# Patient Record
Sex: MALE
[De-identification: names, ages, dates, MRNs, and addresses within clinical notes are randomized per-mention and may not be internally consistent; named-entity substitution may affect disease eponyms.]

---

## 2017-08-09 ENCOUNTER — TRANSCRIPTION ENCOUNTER (OUTPATIENT)
Age: 47
End: 2017-08-09

## 2019-02-12 PROBLEM — Z00.00 ENCOUNTER FOR PREVENTIVE HEALTH EXAMINATION: Status: ACTIVE | Noted: 2019-02-12

## 2019-06-20 ENCOUNTER — APPOINTMENT (OUTPATIENT)
Dept: OTOLARYNGOLOGY | Facility: CLINIC | Age: 49
End: 2019-06-20
Payer: COMMERCIAL

## 2019-06-20 VITALS
DIASTOLIC BLOOD PRESSURE: 87 MMHG | HEART RATE: 85 BPM | HEIGHT: 67 IN | SYSTOLIC BLOOD PRESSURE: 139 MMHG | BODY MASS INDEX: 28.72 KG/M2 | WEIGHT: 183 LBS

## 2019-06-20 DIAGNOSIS — Z78.9 OTHER SPECIFIED HEALTH STATUS: ICD-10-CM

## 2019-06-20 DIAGNOSIS — J34.89 OTHER SPECIFIED DISORDERS OF NOSE AND NASAL SINUSES: ICD-10-CM

## 2019-06-20 DIAGNOSIS — H93.8X3 OTHER SPECIFIED DISORDERS OF EAR, BILATERAL: ICD-10-CM

## 2019-06-20 DIAGNOSIS — H61.23 IMPACTED CERUMEN, BILATERAL: ICD-10-CM

## 2019-06-20 DIAGNOSIS — J32.9 CHRONIC SINUSITIS, UNSPECIFIED: ICD-10-CM

## 2019-06-20 PROCEDURE — 31231 NASAL ENDOSCOPY DX: CPT

## 2019-06-20 PROCEDURE — 99204 OFFICE O/P NEW MOD 45 MIN: CPT | Mod: 25

## 2019-06-20 PROCEDURE — 69210 REMOVE IMPACTED EAR WAX UNI: CPT

## 2019-06-20 NOTE — PHYSICAL EXAM
[de-identified] : total impaction [Nasal Endoscopy Performed] : nasal endoscopy was performed, see procedure section for findings [Normal] : orientation to person, place, and time: normal

## 2019-06-20 NOTE — HISTORY OF PRESENT ILLNESS
[de-identified] : 48 year old male initial visit for chronic sinus infections, sinus pressure, ear pressure and clogged ears.  Patient states symptoms present for the past 3-4 years.  States 2 sinus infections in the past 12 months, most recent Nov 2018, treated with oral antibiotics with moderate relief.  Patient states he has constant sinus pressure, nasal congestion and post nasal drip, causing throat irritation.  States uses Saline sprays multiple times a day, daily with good relief, reports to have used Flonase for 3-4 months with minimal to no relief.  States seen by PCP, Dr. Soto and had X-ray sinus Nov/Dec 2018 and was told has polyp in Left nostril and Right nostril is collecting fluid.  Denies snoring, but has difficulty falling asleep.  Patient states sinus issues are causing bilateral ear pressure and excessive wax build up, leading to intermittent hearing loss.  Denies otorrhea, otalgia, tinnitus, dizziness, vertigo, headaches related to ears and ear infections in the past year.

## 2019-06-20 NOTE — REASON FOR VISIT
[Initial Evaluation] : an initial evaluation for [Other: _____] : [unfilled] [FreeTextEntry2] : Initial visit for chronic sinus infections, sinus pressure, ear pressure and clogged ears.

## 2019-06-20 NOTE — REVIEW OF SYSTEMS
[Seasonal Allergies] : seasonal allergies [Post Nasal Drip] : post nasal drip [Nasal Congestion] : nasal congestion [Sinus Pressure] : sinus pressure [Sinus Pain] : sinus pain [Throat Clearing] : throat clearing [Throat Dryness] : throat dryness [Throat Itching] : throat itching [As Noted in HPI] : as noted in HPI [Negative] : Heme/Lymph

## 2019-06-20 NOTE — PROCEDURE
[de-identified] : Small white lesion left anterior vocal fold- ? small cyst [Cerumen Impaction] : Cerumen Impaction [] : Removal of Cerumen [FreeTextEntry6] : Cerumen was removed from both ears under binocular microscopy with a combination of a suction and/or a loop curette. The patient tolerated the procedure well and there were no complications. The  findings are noted above.\par

## 2019-09-19 ENCOUNTER — APPOINTMENT (OUTPATIENT)
Dept: OTOLARYNGOLOGY | Facility: CLINIC | Age: 49
End: 2019-09-19
Payer: COMMERCIAL

## 2019-09-19 VITALS
BODY MASS INDEX: 28.72 KG/M2 | WEIGHT: 183 LBS | HEIGHT: 67 IN | DIASTOLIC BLOOD PRESSURE: 89 MMHG | HEART RATE: 71 BPM | SYSTOLIC BLOOD PRESSURE: 145 MMHG

## 2019-09-19 DIAGNOSIS — R09.82 POSTNASAL DRIP: ICD-10-CM

## 2019-09-19 DIAGNOSIS — J38.3 OTHER DISEASES OF VOCAL CORDS: ICD-10-CM

## 2019-09-19 PROCEDURE — 99214 OFFICE O/P EST MOD 30 MIN: CPT | Mod: 25

## 2019-09-19 PROCEDURE — 31575 DIAGNOSTIC LARYNGOSCOPY: CPT

## 2019-09-19 NOTE — HISTORY OF PRESENT ILLNESS
[de-identified] : Seen 3 months ago withClogged ears cerumen impaction- no nasal lesion or polyps - placed on flonase .at this same time was found to have Laryngeal lesion well rounded cystic - will recheck 8-12 weeks. If any voice change come sone. nasal symptoms are better but the hoarseness persists which he feels is secondary to the postnasal drip

## 2019-09-19 NOTE — PROCEDURE
[de-identified] : Fiberoptic Laryngoscopy (54252)\par \par Procedure performed: Fiberoptic Laryngeal Endoscopy - Diagnostic\par Pre-op/post op indication: hoarseness\par Patient was unable to cooperate with mirror. After informed verbal consent is obtained, the fiberoptic nasal endoscope is passed via the  right nasal cavity. \par Findings: base of tongue and vallecula clear, hypopharynx normal without pooled secretions, crisp epiglottis, no evidence of laryngomalacia, bilateral vocal fold mobility full and symmetric. There was a well-defined lesion along the free edge of the left vocal fold.and appeared to be more well defined as seen 3 months ago\par

## 2019-09-19 NOTE — REASON FOR VISIT
[Subsequent Evaluation] : a subsequent evaluation for [FreeTextEntry2] : patient states is here to follow up for sinus infection and both ears

## 2019-09-19 NOTE — PHYSICAL EXAM
[Nasal Endoscopy Performed] : nasal endoscopy was performed, see procedure section for findings [Normal] : salivary glands are normal

## 2019-10-16 ENCOUNTER — APPOINTMENT (OUTPATIENT)
Dept: OTOLARYNGOLOGY | Facility: CLINIC | Age: 49
End: 2019-10-16

## 2022-10-06 ENCOUNTER — APPOINTMENT (OUTPATIENT)
Dept: INTERNAL MEDICINE | Facility: CLINIC | Age: 52
End: 2022-10-06

## 2022-11-07 ENCOUNTER — APPOINTMENT (OUTPATIENT)
Dept: INTERNAL MEDICINE | Facility: CLINIC | Age: 52
End: 2022-11-07

## 2023-02-13 ENCOUNTER — NON-APPOINTMENT (OUTPATIENT)
Age: 53
End: 2023-02-13

## 2023-02-13 ENCOUNTER — APPOINTMENT (OUTPATIENT)
Dept: INTERNAL MEDICINE | Facility: CLINIC | Age: 53
End: 2023-02-13
Payer: COMMERCIAL

## 2023-02-13 VITALS
TEMPERATURE: 97.63 F | SYSTOLIC BLOOD PRESSURE: 143 MMHG | DIASTOLIC BLOOD PRESSURE: 89 MMHG | WEIGHT: 170 LBS | HEIGHT: 67 IN | OXYGEN SATURATION: 98 % | HEART RATE: 73 BPM | BODY MASS INDEX: 26.68 KG/M2 | RESPIRATION RATE: 18 BRPM

## 2023-02-13 DIAGNOSIS — Z00.00 ENCOUNTER FOR GENERAL ADULT MEDICAL EXAMINATION W/OUT ABNORMAL FINDINGS: ICD-10-CM

## 2023-02-13 DIAGNOSIS — Z76.89 PERSONS ENCOUNTERING HEALTH SERVICES IN OTHER SPECIFIED CIRCUMSTANCES: ICD-10-CM

## 2023-02-13 DIAGNOSIS — Z12.11 ENCOUNTER FOR SCREENING FOR MALIGNANT NEOPLASM OF COLON: ICD-10-CM

## 2023-02-13 DIAGNOSIS — Z82.49 FAMILY HISTORY OF ISCHEMIC HEART DISEASE AND OTHER DISEASES OF THE CIRCULATORY SYSTEM: ICD-10-CM

## 2023-02-13 PROCEDURE — 99386 PREV VISIT NEW AGE 40-64: CPT

## 2023-02-13 NOTE — PLAN
[FreeTextEntry1] : * routine general labs done\par * age appropriate health maintenance recommendations reviewed, discussed including healthy diet, regular exercise\par * low sodium diet discussed , stressed\par * to monitor BP at home and keep readings log to review next visit\par * FIT test\par * prescribed omeprazole 20 mg\par * he declines influenza vaccine\par * f/u one month.

## 2023-02-13 NOTE — HEALTH RISK ASSESSMENT
[Good] : ~his/her~  mood as  good [Yes] : Yes [Monthly or less (1 pt)] : Monthly or less (1 point) [1 or 2 (0 pts)] : 1 or 2 (0 points) [No falls in past year] : Patient reported no falls in the past year [0] : 2) Feeling down, depressed, or hopeless: Not at all (0) [PHQ-2 Negative - No further assessment needed] : PHQ-2 Negative - No further assessment needed [Patient reported colonoscopy was normal] : Patient reported colonoscopy was normal [HIV test declined] : HIV test declined [Hepatitis C test declined] : Hepatitis C test declined [None] : None [With Family] : lives with family [Employed] : employed [College] : College [] :  [# Of Children ___] : has [unfilled] children [Sexually Active] : sexually active [Fully functional (bathing, dressing, toileting, transferring, walking, feeding)] : Fully functional (bathing, dressing, toileting, transferring, walking, feeding) [Fully functional (using the telephone, shopping, preparing meals, housekeeping, doing laundry, using] : Fully functional and needs no help or supervision to perform IADLs (using the telephone, shopping, preparing meals, housekeeping, doing laundry, using transportation, managing medications and managing finances) [Smoke Detector] : smoke detector [Carbon Monoxide Detector] : carbon monoxide detector [Seat Belt] :  uses seat belt [Never] : Never [[id number]] : 0 [Change in mental status noted] : No change in mental status noted [Reports changes in hearing] : Reports no changes in hearing [Reports changes in vision] : Reports no changes in vision [Reports changes in dental health] : Reports no changes in dental health [Guns at Home] : no guns at home [ColonoscopyDate] : 03/2022 [FreeTextEntry2] : travis

## 2023-02-13 NOTE — HISTORY OF PRESENT ILLNESS
[de-identified] : 52 years old male presents to establish care, annual physical exam, refers h/o gastritis with intermittent heartburn and bloating

## 2023-02-14 ENCOUNTER — LABORATORY RESULT (OUTPATIENT)
Age: 53
End: 2023-02-14

## 2023-02-14 LAB
25(OH)D3 SERPL-MCNC: 55.8 NG/ML
ALBUMIN SERPL ELPH-MCNC: 4.8 G/DL
ALP BLD-CCNC: 71 U/L
ALT SERPL-CCNC: 17 U/L
ANION GAP SERPL CALC-SCNC: 13 MMOL/L
APPEARANCE: CLEAR
AST SERPL-CCNC: 24 U/L
BACTERIA: NEGATIVE
BASOPHILS # BLD AUTO: 0.04 K/UL
BASOPHILS NFR BLD AUTO: 0.7 %
BILIRUB SERPL-MCNC: 0.5 MG/DL
BILIRUBIN URINE: NEGATIVE
BLOOD URINE: NEGATIVE
BUN SERPL-MCNC: 19 MG/DL
CALCIUM SERPL-MCNC: 9.5 MG/DL
CHLORIDE SERPL-SCNC: 104 MMOL/L
CHOLEST SERPL-MCNC: 200 MG/DL
CO2 SERPL-SCNC: 23 MMOL/L
COLOR: NORMAL
CREAT SERPL-MCNC: 0.97 MG/DL
EGFR: 93 ML/MIN/1.73M2
EOSINOPHIL # BLD AUTO: 0.09 K/UL
EOSINOPHIL NFR BLD AUTO: 1.5 %
ESTIMATED AVERAGE GLUCOSE: 103 MG/DL
GLUCOSE QUALITATIVE U: NEGATIVE
GLUCOSE SERPL-MCNC: 85 MG/DL
HBA1C MFR BLD HPLC: 5.2 %
HCT VFR BLD CALC: 43.8 %
HDLC SERPL-MCNC: 57 MG/DL
HGB BLD-MCNC: 15.4 G/DL
HYALINE CASTS: 2 /LPF
IMM GRANULOCYTES NFR BLD AUTO: 0.3 %
KETONES URINE: NEGATIVE
LDLC SERPL CALC-MCNC: 126 MG/DL
LEUKOCYTE ESTERASE URINE: NEGATIVE
LYMPHOCYTES # BLD AUTO: 1.07 K/UL
LYMPHOCYTES NFR BLD AUTO: 17.9 %
MAN DIFF?: NORMAL
MCHC RBC-ENTMCNC: 31.1 PG
MCHC RBC-ENTMCNC: 35.2 GM/DL
MCV RBC AUTO: 88.5 FL
MICROSCOPIC-UA: NORMAL
MONOCYTES # BLD AUTO: 0.53 K/UL
MONOCYTES NFR BLD AUTO: 8.9 %
NEUTROPHILS # BLD AUTO: 4.23 K/UL
NEUTROPHILS NFR BLD AUTO: 70.7 %
NITRITE URINE: NEGATIVE
NONHDLC SERPL-MCNC: 143 MG/DL
PH URINE: 6
PLATELET # BLD AUTO: 284 K/UL
POTASSIUM SERPL-SCNC: 4.7 MMOL/L
PROT SERPL-MCNC: 7.1 G/DL
PROTEIN URINE: ABNORMAL
PSA FREE FLD-MCNC: 44 %
PSA FREE SERPL-MCNC: 0.31 NG/ML
PSA SERPL-MCNC: 0.7 NG/ML
RBC # BLD: 4.95 M/UL
RBC # FLD: 12.7 %
RED BLOOD CELLS URINE: 1 /HPF
SODIUM SERPL-SCNC: 140 MMOL/L
SPECIFIC GRAVITY URINE: 1.02
SQUAMOUS EPITHELIAL CELLS: 0 /HPF
T4 FREE SERPL-MCNC: 1.4 NG/DL
TRIGL SERPL-MCNC: 86 MG/DL
TSH SERPL-ACNC: 2.47 UIU/ML
UROBILINOGEN URINE: NORMAL
VIT B12 SERPL-MCNC: 851 PG/ML
WBC # FLD AUTO: 5.98 K/UL
WHITE BLOOD CELLS URINE: 0 /HPF

## 2023-05-04 ENCOUNTER — APPOINTMENT (OUTPATIENT)
Dept: INTERNAL MEDICINE | Facility: CLINIC | Age: 53
End: 2023-05-04
Payer: COMMERCIAL

## 2023-05-04 VITALS
SYSTOLIC BLOOD PRESSURE: 133 MMHG | WEIGHT: 170 LBS | BODY MASS INDEX: 26.68 KG/M2 | HEART RATE: 70 BPM | TEMPERATURE: 97.6 F | DIASTOLIC BLOOD PRESSURE: 86 MMHG | OXYGEN SATURATION: 98 % | HEIGHT: 67 IN

## 2023-05-04 DIAGNOSIS — R03.0 ELEVATED BLOOD-PRESSURE READING, W/OUT DIAGNOSIS OF HYPERTENSION: ICD-10-CM

## 2023-05-04 DIAGNOSIS — R80.9 PROTEINURIA, UNSPECIFIED: ICD-10-CM

## 2023-05-04 DIAGNOSIS — R10.13 EPIGASTRIC PAIN: ICD-10-CM

## 2023-05-04 PROCEDURE — 99214 OFFICE O/P EST MOD 30 MIN: CPT

## 2023-05-04 RX ORDER — PANTOPRAZOLE 40 MG/1
40 TABLET, DELAYED RELEASE ORAL DAILY
Qty: 30 | Refills: 2 | Status: ACTIVE | COMMUNITY
Start: 2023-05-04 | End: 1900-01-01

## 2023-05-04 NOTE — PLAN
[FreeTextEntry1] : * BP readings at home normal\par * low sodium diet stressed\par * start pantoprazole 40 mg\par * low cholesterol, low triglycerides diet,dietary counseling given; dietary avoidance discussed; diet and exercise reviewed with patient\par * GI referral \par * recommended not to drink protein shake and protein bars\par * increase water intake\par * f/u six months.

## 2023-05-04 NOTE — HISTORY OF PRESENT ILLNESS
[FreeTextEntry1] : follow up\par  [de-identified] : 52 years old male presents for follow up to review and discuss labs test results , he still c/o GI symptoms, discomfort , bloating, gassy; heartburn not completely relieved with OTC omeprazole

## 2023-05-31 ENCOUNTER — APPOINTMENT (OUTPATIENT)
Dept: ORTHOPEDIC SURGERY | Facility: CLINIC | Age: 53
End: 2023-05-31
Payer: COMMERCIAL

## 2023-05-31 VITALS — WEIGHT: 170 LBS | BODY MASS INDEX: 26.68 KG/M2 | HEIGHT: 67 IN

## 2023-05-31 DIAGNOSIS — M54.16 RADICULOPATHY, LUMBAR REGION: ICD-10-CM

## 2023-05-31 DIAGNOSIS — M47.817 SPONDYLOSIS W/OUT MYELOPATHY OR RADICULOPATHY, LUMBOSACRAL REGION: ICD-10-CM

## 2023-05-31 PROCEDURE — 99203 OFFICE O/P NEW LOW 30 MIN: CPT

## 2023-05-31 PROCEDURE — 72100 X-RAY EXAM L-S SPINE 2/3 VWS: CPT

## 2023-05-31 NOTE — PHYSICAL EXAM
[Antalgic] : not antalgic [de-identified] : Examination of the lumbar spine reveals no midline tenderness palpation, step-offs, or skin lesions. Decreased range of motion with respect to flexion, extension, lateral bending, and rotation. No tenderness to palpation of the sciatic notch. No tenderness palpation of the bilateral greater trochanters. No pain with passive internal/external rotation of the hips. No instability of bilateral lower extremities.  Negative LORI. Negative straight leg raise bilaterally. No bowstring. Negative femoral stretch. 5 out of 5 iliopsoas, hip abductors, hips adductors, quadriceps, hamstrings, gastrocsoleus, tibialis anterior, extensor hallucis longus, peroneals. Grossly intact sensation to light touch bilateral lower extremities. 1+ patellar and Achilles reflexes. Downgoing Babinski. No clonus. Intact proprioception. Palpable pulses. No skin lesion and no edema on the right and left lower extremities. [de-identified] : AP lateral lumbar x-rays with some spondylosis without instability or aggressive lesions.

## 2023-05-31 NOTE — DISCUSSION/SUMMARY
[de-identified] : We discussed further treatment options.  He will try a course of physical therapy.  MRI if not improved or worsen.

## 2023-06-29 ENCOUNTER — APPOINTMENT (OUTPATIENT)
Dept: GASTROENTEROLOGY | Facility: CLINIC | Age: 53
End: 2023-06-29
Payer: COMMERCIAL

## 2023-06-29 VITALS
DIASTOLIC BLOOD PRESSURE: 80 MMHG | HEART RATE: 75 BPM | OXYGEN SATURATION: 98 % | WEIGHT: 167 LBS | BODY MASS INDEX: 26.21 KG/M2 | TEMPERATURE: 97.5 F | SYSTOLIC BLOOD PRESSURE: 130 MMHG | HEIGHT: 67 IN

## 2023-06-29 DIAGNOSIS — Z78.9 OTHER SPECIFIED HEALTH STATUS: ICD-10-CM

## 2023-06-29 DIAGNOSIS — R14.0 ABDOMINAL DISTENSION (GASEOUS): ICD-10-CM

## 2023-06-29 DIAGNOSIS — R19.4 CHANGE IN BOWEL HABIT: ICD-10-CM

## 2023-06-29 DIAGNOSIS — Z87.891 PERSONAL HISTORY OF NICOTINE DEPENDENCE: ICD-10-CM

## 2023-06-29 DIAGNOSIS — K64.9 UNSPECIFIED HEMORRHOIDS: ICD-10-CM

## 2023-06-29 DIAGNOSIS — R10.9 UNSPECIFIED ABDOMINAL PAIN: ICD-10-CM

## 2023-06-29 DIAGNOSIS — R12 HEARTBURN: ICD-10-CM

## 2023-06-29 DIAGNOSIS — R15.0 INCOMPLETE DEFECATION: ICD-10-CM

## 2023-06-29 PROCEDURE — 99204 OFFICE O/P NEW MOD 45 MIN: CPT

## 2023-06-29 RX ORDER — OMEPRAZOLE 20 MG/1
20 CAPSULE, DELAYED RELEASE ORAL DAILY
Qty: 30 | Refills: 0 | Status: DISCONTINUED | COMMUNITY
Start: 2023-02-13 | End: 2023-06-29

## 2023-06-29 RX ORDER — HYDROCORTISONE 2.5% 25 MG/G
2.5 CREAM TOPICAL
Qty: 1 | Refills: 1 | Status: ACTIVE | COMMUNITY
Start: 2023-06-29 | End: 1900-01-01

## 2023-06-29 RX ORDER — SODIUM PICOSULFATE, MAGNESIUM OXIDE, AND ANHYDROUS CITRIC ACID 12; 3.5; 1 G/175ML; G/175ML; MG/175ML
10-3.5-12 MG-GM LIQUID ORAL
Qty: 2 | Refills: 0 | Status: ACTIVE | COMMUNITY
Start: 2023-06-29 | End: 1900-01-01

## 2023-06-29 RX ORDER — CHOLECALCIFEROL (VITAMIN D3) 10(400)/ML
DROPS ORAL
Refills: 0 | Status: ACTIVE | COMMUNITY

## 2023-06-29 RX ORDER — BACILLUS COAGULANS/INULIN 1B-250 MG
CAPSULE ORAL
Refills: 0 | Status: ACTIVE | COMMUNITY

## 2023-06-29 RX ORDER — PSYLLIUM SEED
PACKET (EA) ORAL
Refills: 0 | Status: ACTIVE | COMMUNITY

## 2023-06-29 RX ORDER — FLUTICASONE PROPIONATE 50 UG/1
50 SPRAY, METERED NASAL DAILY
Qty: 1 | Refills: 5 | Status: DISCONTINUED | COMMUNITY
Start: 2019-06-20 | End: 2023-06-29

## 2023-07-01 NOTE — REASON FOR VISIT
[Initial Evaluation] : an initial evaluation [FreeTextEntry1] : Abdominal pain, bloating, heartburn, incomplete bowel movements, change in bowel habits, hemorrhoids

## 2023-07-01 NOTE — CONSULT LETTER
[FreeTextEntry1] : Dear Dr. Jose Daniel Nails,\par \par I had the pleasure of seeing your patient EFREN ROJAS in the office today.  My office note is attached. PLEASE READ THE "ASSESSMENT" SECTION OF THE NOTE TO SEE MY IMPRESSION AND PLAN.\par \par Thank you very much for allowing me to participate in the care of your patient.\par \par Sincerely,\par \par Pranay Osuna M.D., FAC, FACP\par Director, Celiac Program at [facility]\par  of Medicine, [facility] [employer] at [facility] Adjunct  of Medicine, [facility] Practice Director, [facility] - Gastroenterology at [facility] [address]\[place] [address]\par Tel: [phone]\par Email: [email or web address] \par \par The attached note has been created using a voice recognition system (Dragon).  There may be some misspellings and typos.  Please call my office if you have any issues or questions.

## 2023-07-01 NOTE — HISTORY OF PRESENT ILLNESS
[FreeTextEntry1] : The patient is a 52-year-old man who has had constant mid abdominal pressure which has been worsening.  It is associated with bloating and gas.  He was suffering from heartburn but has been on pantoprazole 40 mg a day for the past month and has not been having any heartburn since being on the medication.  He denies dysphagia, nausea, vomiting.  He also notes that for the past year, his bowel movements are incomplete and different than previously.  He is on Metamucil powder and has 3-4 bowel movements a day.  He denies melena or bright red blood per rectum.  The patient's weight is stable.  He has hemorrhoidal irritation for which she uses Preparation H daily with relief.  Of note, the patient was on whey protein powder up until 2 months ago.  He has a reported history of a gastric polyp and a Schatzki's ring.  He has had a colonoscopy which he believes was 2-1/2 years ago although the records indicate that he had an upper endoscopy and not a colonoscopy on the date in question.  He takes Motrin intermittently.  The patient has not been admitted to the hospital in the past year and denies any cardiac issues.

## 2023-07-01 NOTE — ASSESSMENT
[FreeTextEntry1] : Patient with mid abdominal pain and bloating along with heartburn which has responded to pantoprazole 40 mg a day.  He notes a change in bowel habits with incomplete stools.  He also has symptomatic hemorrhoids.\par \par After much discussion, we agree that it is best to evaluate the symptoms fully, as they are new over the past year.  An EGD and colonoscopy have been scheduled. The risks, benefits, alternatives, and limitations of the procedures, including the possibility of missed lesions, were explained.\par \par Patient will continue pantoprazole 40 mg a day for the time being.\par \par Patient was given Proctozone 2.5% cream to use as needed for his hemorrhoids.

## 2023-10-27 ENCOUNTER — APPOINTMENT (OUTPATIENT)
Dept: GASTROENTEROLOGY | Facility: AMBULATORY MEDICAL SERVICES | Age: 53
End: 2023-10-27
Payer: COMMERCIAL

## 2023-10-27 PROCEDURE — 43239 EGD BIOPSY SINGLE/MULTIPLE: CPT | Mod: 59

## 2023-10-27 PROCEDURE — 45378 DIAGNOSTIC COLONOSCOPY: CPT

## 2023-11-02 ENCOUNTER — APPOINTMENT (OUTPATIENT)
Dept: INTERNAL MEDICINE | Facility: CLINIC | Age: 53
End: 2023-11-02
Payer: COMMERCIAL

## 2023-11-02 VITALS
HEART RATE: 67 BPM | DIASTOLIC BLOOD PRESSURE: 86 MMHG | RESPIRATION RATE: 17 BRPM | OXYGEN SATURATION: 99 % | TEMPERATURE: 97.9 F | HEIGHT: 67 IN | BODY MASS INDEX: 26.68 KG/M2 | WEIGHT: 170 LBS | SYSTOLIC BLOOD PRESSURE: 127 MMHG

## 2023-11-02 DIAGNOSIS — Z87.19 PERSONAL HISTORY OF OTHER DISEASES OF THE DIGESTIVE SYSTEM: ICD-10-CM

## 2023-11-02 PROCEDURE — 99214 OFFICE O/P EST MOD 30 MIN: CPT

## 2023-11-03 LAB
ALBUMIN SERPL ELPH-MCNC: 5 G/DL
ALP BLD-CCNC: 68 U/L
ALT SERPL-CCNC: 21 U/L
ANION GAP SERPL CALC-SCNC: 13 MMOL/L
AST SERPL-CCNC: 27 U/L
BILIRUB SERPL-MCNC: 0.9 MG/DL
BUN SERPL-MCNC: 17 MG/DL
CALCIUM SERPL-MCNC: 10.1 MG/DL
CHLORIDE SERPL-SCNC: 103 MMOL/L
CHOLEST SERPL-MCNC: 192 MG/DL
CO2 SERPL-SCNC: 26 MMOL/L
CREAT SERPL-MCNC: 1.03 MG/DL
EGFR: 86 ML/MIN/1.73M2
GLUCOSE SERPL-MCNC: 77 MG/DL
HDLC SERPL-MCNC: 54 MG/DL
LDLC SERPL CALC-MCNC: 119 MG/DL
NONHDLC SERPL-MCNC: 137 MG/DL
POTASSIUM SERPL-SCNC: 4.6 MMOL/L
PROT SERPL-MCNC: 7.1 G/DL
SODIUM SERPL-SCNC: 141 MMOL/L
TRIGL SERPL-MCNC: 102 MG/DL

## 2024-04-11 ENCOUNTER — APPOINTMENT (OUTPATIENT)
Dept: INTERNAL MEDICINE | Facility: CLINIC | Age: 54
End: 2024-04-11
Payer: COMMERCIAL

## 2024-04-11 VITALS
DIASTOLIC BLOOD PRESSURE: 89 MMHG | HEART RATE: 65 BPM | RESPIRATION RATE: 17 BRPM | HEIGHT: 67 IN | TEMPERATURE: 98.33 F | SYSTOLIC BLOOD PRESSURE: 133 MMHG | BODY MASS INDEX: 25.43 KG/M2 | WEIGHT: 162 LBS | OXYGEN SATURATION: 100 %

## 2024-04-11 DIAGNOSIS — E78.00 PURE HYPERCHOLESTEROLEMIA, UNSPECIFIED: ICD-10-CM

## 2024-04-11 DIAGNOSIS — K40.90 UNILATERAL INGUINAL HERNIA, W/OUT OBSTRUCTION OR GANGRENE, NOT SPECIFIED AS RECURRENT: ICD-10-CM

## 2024-04-11 PROCEDURE — 99214 OFFICE O/P EST MOD 30 MIN: CPT

## 2024-04-11 PROCEDURE — G2211 COMPLEX E/M VISIT ADD ON: CPT

## 2024-04-11 NOTE — PLAN
[FreeTextEntry1] : 1. hypercholesterolemia * general fasting labs done * low cholesterol, low triglycerides diet,dietary counseling given; dietary avoidance discussed; diet and exercise reviewed with patient 2. right inguinal hernia * general surgery referral * to f/u in two months.

## 2024-04-11 NOTE — HISTORY OF PRESENT ILLNESS
[FreeTextEntry1] : follow up  [de-identified] : 53 years old male presents for follow up, states feeling well, lost weight on diet, refers chronic recurrent right inguinal hernia, sometimes painful

## 2024-04-12 LAB
25(OH)D3 SERPL-MCNC: 55.1 NG/ML
ALBUMIN SERPL ELPH-MCNC: 4.7 G/DL
ALP BLD-CCNC: 64 U/L
ALT SERPL-CCNC: 29 U/L
ANION GAP SERPL CALC-SCNC: 12 MMOL/L
APPEARANCE: CLEAR
AST SERPL-CCNC: 34 U/L
BACTERIA: NEGATIVE /HPF
BASOPHILS # BLD AUTO: 0.04 K/UL
BASOPHILS NFR BLD AUTO: 0.5 %
BILIRUB SERPL-MCNC: 0.4 MG/DL
BILIRUBIN URINE: NEGATIVE
BLOOD URINE: NEGATIVE
BUN SERPL-MCNC: 23 MG/DL
CALCIUM SERPL-MCNC: 9.3 MG/DL
CAST: 0 /LPF
CHLORIDE SERPL-SCNC: 105 MMOL/L
CHOLEST SERPL-MCNC: 195 MG/DL
CO2 SERPL-SCNC: 23 MMOL/L
COLOR: YELLOW
CREAT SERPL-MCNC: 1.08 MG/DL
EGFR: 81 ML/MIN/1.73M2
EOSINOPHIL # BLD AUTO: 0.05 K/UL
EOSINOPHIL NFR BLD AUTO: 0.7 %
EPITHELIAL CELLS: 0 /HPF
ESTIMATED AVERAGE GLUCOSE: 97 MG/DL
GLUCOSE QUALITATIVE U: NEGATIVE MG/DL
GLUCOSE SERPL-MCNC: 79 MG/DL
HBA1C MFR BLD HPLC: 5 %
HCT VFR BLD CALC: 41.9 %
HDLC SERPL-MCNC: 57 MG/DL
HGB BLD-MCNC: 14.4 G/DL
IMM GRANULOCYTES NFR BLD AUTO: 0.1 %
KETONES URINE: NEGATIVE MG/DL
LDLC SERPL CALC-MCNC: 106 MG/DL
LEUKOCYTE ESTERASE URINE: NEGATIVE
LYMPHOCYTES # BLD AUTO: 1.05 K/UL
LYMPHOCYTES NFR BLD AUTO: 14.3 %
MAN DIFF?: NORMAL
MCHC RBC-ENTMCNC: 31.2 PG
MCHC RBC-ENTMCNC: 34.4 GM/DL
MCV RBC AUTO: 90.9 FL
MICROSCOPIC-UA: NORMAL
MONOCYTES # BLD AUTO: 0.62 K/UL
MONOCYTES NFR BLD AUTO: 8.4 %
NEUTROPHILS # BLD AUTO: 5.57 K/UL
NEUTROPHILS NFR BLD AUTO: 76 %
NITRITE URINE: NEGATIVE
NONHDLC SERPL-MCNC: 139 MG/DL
PH URINE: 6
PLATELET # BLD AUTO: 256 K/UL
POTASSIUM SERPL-SCNC: 4.9 MMOL/L
PROT SERPL-MCNC: 7 G/DL
PROTEIN URINE: NEGATIVE MG/DL
PSA FREE FLD-MCNC: 56 %
PSA FREE SERPL-MCNC: 0.37 NG/ML
PSA SERPL-MCNC: 0.67 NG/ML
RBC # BLD: 4.61 M/UL
RBC # FLD: 12.7 %
RED BLOOD CELLS URINE: 0 /HPF
SODIUM SERPL-SCNC: 140 MMOL/L
SPECIFIC GRAVITY URINE: 1.02
T4 FREE SERPL-MCNC: 1.3 NG/DL
TRIGL SERPL-MCNC: 187 MG/DL
TSH SERPL-ACNC: 1.73 UIU/ML
UROBILINOGEN URINE: 0.2 MG/DL
VIT B12 SERPL-MCNC: 1121 PG/ML
WBC # FLD AUTO: 7.34 K/UL
WHITE BLOOD CELLS URINE: 0 /HPF

## 2024-07-03 ENCOUNTER — APPOINTMENT (OUTPATIENT)
Dept: INTERNAL MEDICINE | Facility: CLINIC | Age: 54
End: 2024-07-03

## 2024-10-07 ENCOUNTER — NON-APPOINTMENT (OUTPATIENT)
Age: 54
End: 2024-10-07

## 2024-10-25 ENCOUNTER — NON-APPOINTMENT (OUTPATIENT)
Age: 54
End: 2024-10-25

## 2025-01-06 NOTE — HISTORY OF PRESENT ILLNESS
[de-identified] : Mr. EFREN ROJAS  is a 52 year old male who presents with 1 week of low back pain and pain in his left buttock and groin..  Denies any LE radicular symptoms.  Normal bowel and bladder control.   Denies any recent fevers, chills, sweats, weight loss, or infection.  He has taken nsaids which has helped.  He does have a chronic history of low back pain.\par \par The patients past medical history, past surgical history, medications, allergies, and social history were reviewed by me today with the patient and documented accordingly.  In addition, the patient's family history, which is noncontributory to their visit, was also reviewed.\par  4 = No assist / stand by assistance

## 2025-05-29 ENCOUNTER — APPOINTMENT (OUTPATIENT)
Dept: INTERNAL MEDICINE | Facility: CLINIC | Age: 55
End: 2025-05-29
Payer: COMMERCIAL

## 2025-05-29 ENCOUNTER — NON-APPOINTMENT (OUTPATIENT)
Age: 55
End: 2025-05-29

## 2025-05-29 VITALS
HEART RATE: 66 BPM | OXYGEN SATURATION: 99 % | WEIGHT: 167 LBS | BODY MASS INDEX: 26.21 KG/M2 | RESPIRATION RATE: 17 BRPM | DIASTOLIC BLOOD PRESSURE: 80 MMHG | TEMPERATURE: 97.3 F | SYSTOLIC BLOOD PRESSURE: 125 MMHG | HEIGHT: 67 IN

## 2025-05-29 DIAGNOSIS — E78.00 PURE HYPERCHOLESTEROLEMIA, UNSPECIFIED: ICD-10-CM

## 2025-05-29 DIAGNOSIS — Z00.00 ENCOUNTER FOR GENERAL ADULT MEDICAL EXAMINATION W/OUT ABNORMAL FINDINGS: ICD-10-CM

## 2025-05-29 DIAGNOSIS — Z87.19 PERSONAL HISTORY OF OTHER DISEASES OF THE DIGESTIVE SYSTEM: ICD-10-CM

## 2025-05-29 PROCEDURE — 99396 PREV VISIT EST AGE 40-64: CPT

## 2025-05-30 LAB
25(OH)D3 SERPL-MCNC: 29.2 NG/ML
ALBUMIN SERPL ELPH-MCNC: 4.6 G/DL
ALP BLD-CCNC: 63 U/L
ALT SERPL-CCNC: 43 U/L
ANION GAP SERPL CALC-SCNC: 14 MMOL/L
APPEARANCE: CLEAR
AST SERPL-CCNC: 50 U/L
BACTERIA: NEGATIVE /HPF
BASOPHILS # BLD AUTO: 0.05 K/UL
BASOPHILS NFR BLD AUTO: 0.8 %
BILIRUB SERPL-MCNC: 0.8 MG/DL
BILIRUBIN URINE: NEGATIVE
BLOOD URINE: NEGATIVE
BUN SERPL-MCNC: 18 MG/DL
CALCIUM SERPL-MCNC: 9.8 MG/DL
CAST: 0 /LPF
CHLORIDE SERPL-SCNC: 101 MMOL/L
CHOLEST SERPL-MCNC: 220 MG/DL
CO2 SERPL-SCNC: 23 MMOL/L
COLOR: YELLOW
CREAT SERPL-MCNC: 1.2 MG/DL
EGFRCR SERPLBLD CKD-EPI 2021: 71 ML/MIN/1.73M2
EOSINOPHIL # BLD AUTO: 0.1 K/UL
EOSINOPHIL NFR BLD AUTO: 1.5 %
EPITHELIAL CELLS: 0 /HPF
ESTIMATED AVERAGE GLUCOSE: 97 MG/DL
GLUCOSE QUALITATIVE U: NEGATIVE MG/DL
GLUCOSE SERPL-MCNC: 78 MG/DL
HBA1C MFR BLD HPLC: 5 %
HCT VFR BLD CALC: 44.4 %
HDLC SERPL-MCNC: 53 MG/DL
HGB BLD-MCNC: 15 G/DL
IMM GRANULOCYTES NFR BLD AUTO: 0.5 %
KETONES URINE: NEGATIVE MG/DL
LDLC SERPL-MCNC: 152 MG/DL
LEUKOCYTE ESTERASE URINE: NEGATIVE
LYMPHOCYTES # BLD AUTO: 1.11 K/UL
LYMPHOCYTES NFR BLD AUTO: 16.9 %
MAN DIFF?: NORMAL
MCHC RBC-ENTMCNC: 30.5 PG
MCHC RBC-ENTMCNC: 33.8 G/DL
MCV RBC AUTO: 90.2 FL
MICROSCOPIC-UA: NORMAL
MONOCYTES # BLD AUTO: 0.65 K/UL
MONOCYTES NFR BLD AUTO: 9.9 %
NEUTROPHILS # BLD AUTO: 4.64 K/UL
NEUTROPHILS NFR BLD AUTO: 70.4 %
NITRITE URINE: NEGATIVE
NONHDLC SERPL-MCNC: 167 MG/DL
PH URINE: 6
PLATELET # BLD AUTO: 254 K/UL
POTASSIUM SERPL-SCNC: 5 MMOL/L
PROT SERPL-MCNC: 6.7 G/DL
PROTEIN URINE: NEGATIVE MG/DL
PSA FREE FLD-MCNC: 44 %
PSA FREE SERPL-MCNC: 0.22 NG/ML
PSA SERPL-MCNC: 0.51 NG/ML
RBC # BLD: 4.92 M/UL
RBC # FLD: 12.7 %
RED BLOOD CELLS URINE: 0 /HPF
SODIUM SERPL-SCNC: 137 MMOL/L
SPECIFIC GRAVITY URINE: 1.01
T4 FREE SERPL-MCNC: 1.3 NG/DL
TRIGL SERPL-MCNC: 85 MG/DL
TSH SERPL-ACNC: 1.44 UIU/ML
UROBILINOGEN URINE: 0.2 MG/DL
VIT B12 SERPL-MCNC: 849 PG/ML
WBC # FLD AUTO: 6.58 K/UL
WHITE BLOOD CELLS URINE: 0 /HPF